# Patient Record
Sex: FEMALE | Race: WHITE | NOT HISPANIC OR LATINO | URBAN - METROPOLITAN AREA
[De-identification: names, ages, dates, MRNs, and addresses within clinical notes are randomized per-mention and may not be internally consistent; named-entity substitution may affect disease eponyms.]

---

## 2017-12-29 ENCOUNTER — LAB REQUISITION (OUTPATIENT)
Dept: LAB | Facility: HOSPITAL | Age: 32
End: 2017-12-29
Payer: COMMERCIAL

## 2017-12-29 ENCOUNTER — GENERIC CONVERSION - ENCOUNTER (OUTPATIENT)
Dept: OTHER | Facility: OTHER | Age: 32
End: 2017-12-29

## 2017-12-29 DIAGNOSIS — Z11.3 ENCOUNTER FOR SCREENING FOR INFECTIONS WITH PREDOMINANTLY SEXUAL MODE OF TRANSMISSION: ICD-10-CM

## 2017-12-29 DIAGNOSIS — Z01.419 ENCOUNTER FOR GYNECOLOGICAL EXAMINATION WITHOUT ABNORMAL FINDING: ICD-10-CM

## 2017-12-29 PROCEDURE — 87624 HPV HI-RISK TYP POOLED RSLT: CPT | Performed by: PHYSICIAN ASSISTANT

## 2017-12-29 PROCEDURE — G0145 SCR C/V CYTO,THINLAYER,RESCR: HCPCS | Performed by: PHYSICIAN ASSISTANT

## 2018-01-05 LAB
HPV RRNA GENITAL QL NAA+PROBE: NORMAL
LAB AP GYN PRIMARY INTERPRETATION: NORMAL
Lab: NORMAL

## 2018-01-14 NOTE — MISCELLANEOUS
Message   Recorded as Task   Date: 11/21/2016 01:20 PM, Created By: Natacha Lamar   Task Name: Call Back   Assigned To: Bobbi Woodruff   Regarding Patient: Tim Bergman, Status: Active   Comment:    Lisha Moya - 21 Nov 2016 1:20 PM     TASK CREATED  PT CALLED FOR A REFILL ON HER BCP TO GO TO [facility] [address] Silvia Raid - 21 Nov 2016 1:22 PM     TASK EDITED  sent to pharm,josy w/ wl in dec        Active Problems    1  Birth Control Method - Oral Contraceptives   2  Encounter for gynecological examination without abnormal finding (V72 31) (Z01 419)   3  Encounter for routine gynecological examination (V72 31) (Z01 419)    Current Meds   1  Microgestin FE 1 5/30 1 5-30 MG-MCG Oral Tablet; TAKE 1 TABLET DAILY; Therapy: [id number] to (Klaus Najera)  Requested for: [id number]; Last   Rx:[id number] Ordered    Allergies    1   No Known Drug Allergies    Plan  SocHx: Birth Control Method - Oral Contraceptives    · Microgestin FE 1 5/30 1 5-30 MG-MCG Oral Tablet; TAKE 1 TABLET DAILY    Signatures   Electronically signed by : Ronny Marinelli, ; Nov 21 2016  1:22PM EST                       (Author)

## 2018-01-24 VITALS
HEIGHT: 59 IN | WEIGHT: 145 LBS | BODY MASS INDEX: 29.23 KG/M2 | SYSTOLIC BLOOD PRESSURE: 122 MMHG | DIASTOLIC BLOOD PRESSURE: 78 MMHG

## 2018-12-28 DIAGNOSIS — IMO0001 BIRTH CONTROL: Primary | ICD-10-CM

## 2018-12-28 RX ORDER — NORETHINDRONE ACETATE AND ETHINYL ESTRADIOL 1.5-30(21)
KIT ORAL
Qty: 28 TABLET | Refills: 0 | Status: SHIPPED | OUTPATIENT
Start: 2018-12-28 | End: 2019-01-20 | Stop reason: SDUPTHER

## 2019-01-20 DIAGNOSIS — IMO0001 BIRTH CONTROL: ICD-10-CM

## 2019-01-21 RX ORDER — NORETHINDRONE ACETATE AND ETHINYL ESTRADIOL AND FERROUS FUMARATE 1.5-30(21)
KIT ORAL
Qty: 28 TABLET | Refills: 0 | Status: SHIPPED | OUTPATIENT
Start: 2019-01-21 | End: 2019-01-24 | Stop reason: SDUPTHER

## 2019-01-24 ENCOUNTER — ANNUAL EXAM (OUTPATIENT)
Dept: OBGYN CLINIC | Facility: CLINIC | Age: 34
End: 2019-01-24
Payer: COMMERCIAL

## 2019-01-24 VITALS
HEIGHT: 57 IN | SYSTOLIC BLOOD PRESSURE: 120 MMHG | BODY MASS INDEX: 39.05 KG/M2 | DIASTOLIC BLOOD PRESSURE: 78 MMHG | WEIGHT: 181 LBS

## 2019-01-24 DIAGNOSIS — Z01.419 ENCOUNTER FOR GYNECOLOGICAL EXAMINATION (GENERAL) (ROUTINE) WITHOUT ABNORMAL FINDINGS: Primary | ICD-10-CM

## 2019-01-24 DIAGNOSIS — Z30.41 ENCOUNTER FOR SURVEILLANCE OF CONTRACEPTIVE PILLS: ICD-10-CM

## 2019-01-24 PROCEDURE — 99395 PREV VISIT EST AGE 18-39: CPT | Performed by: NURSE PRACTITIONER

## 2019-01-24 RX ORDER — NORETHINDRONE ACETATE AND ETHINYL ESTRADIOL 1.5-30(21)
1 KIT ORAL DAILY
Qty: 90 TABLET | Refills: 3 | Status: SHIPPED | OUTPATIENT
Start: 2019-01-24 | End: 2020-01-23 | Stop reason: SDUPTHER

## 2019-01-24 RX ORDER — NORETHINDRONE ACETATE AND ETHINYL ESTRADIOL 1.5-30(21)
1 KIT ORAL DAILY
COMMUNITY
Start: 2012-08-03 | End: 2019-01-24 | Stop reason: SDUPTHER

## 2019-01-24 RX ORDER — PREDNISONE 20 MG/1
TABLET ORAL
Refills: 0 | COMMUNITY
Start: 2018-11-09 | End: 2020-02-18 | Stop reason: ALTCHOICE

## 2019-01-24 RX ORDER — FEXOFENADINE HYDROCHLORIDE 60 MG/1
60 TABLET, FILM COATED ORAL DAILY
COMMUNITY

## 2019-01-24 RX ORDER — DILTIAZEM HYDROCHLORIDE 60 MG/1
2 TABLET, FILM COATED ORAL 2 TIMES DAILY
Refills: 5 | COMMUNITY
Start: 2019-01-10 | End: 2020-03-16 | Stop reason: SDUPTHER

## 2019-01-24 RX ORDER — ALBUTEROL SULFATE 1.25 MG/3ML
SOLUTION RESPIRATORY (INHALATION)
COMMUNITY

## 2019-01-24 NOTE — ASSESSMENT & PLAN NOTE
Normal findings on routine annual gyn exam  Recommended monthly SBE, annual CBE  Reviewed ASCCP guidelines and pap noted to be up to date  STI testing was offered and declined at this time; the patient is aware that condoms are recommended for all sexual contact for prevention of STI  The patient likes current contraceptive measure-OCP Jaylon Galeano and desires to continue  Reviewed diet/activity recommendations and reasons to call  F/u in one year for routine annual gyn exam or sooner PRN

## 2019-01-24 NOTE — PROGRESS NOTES
Assessment/Plan:    Encounter for gynecological examination (general) (routine) without abnormal findings  Normal findings on routine annual gyn exam  Recommended monthly SBE, annual CBE  Reviewed ASCCP guidelines and pap noted to be up to date  STI testing was offered and declined at this time; the patient is aware that condoms are recommended for all sexual contact for prevention of STI  The patient likes current contraceptive measure-OCP Trev Dines and desires to continue  Reviewed diet/activity recommendations and reasons to call  F/u in one year for routine annual gyn exam or sooner PRN  Encounter for surveillance of contraceptive pills  The patient likes current OCP-Junel  She denies ACHES and desires to continue  She is aware condoms are advised with all sexual contact for prevention of STI  Refill provided  Reviewed reasons to call  Diagnoses and all orders for this visit:    Encounter for gynecological examination (general) (routine) without abnormal findings    Encounter for surveillance of contraceptive pills  -     norethindrone-ethinyl estradiol-iron (Pama Ceron FE 1 5/30) 1 5-30 MG-MCG tablet; Take 1 tablet by mouth daily    Other orders  -     SYMBICORT 80-4 5 MCG/ACT inhaler; Inhale 2 puffs 2 (two) times a day  -     albuterol (ACCUNEB) 1 25 MG/3ML nebulizer solution; Inhale  -     PROAIR  (90 Base) MCG/ACT inhaler; USE 2 PUFFS EVERY 4 HOURS AS NEEDED FOR WHEEZING  -     predniSONE 20 mg tablet; TAKE 3 TABLETS DAILY FOR 4 DAYS  -     Discontinue: norethindrone-ethinyl estradiol-iron (MICROGESTIN FE 1 5/30) 1 5-30 MG-MCG tablet; Take 1 tablet by mouth daily  -     fexofenadine (ALLEGRA) 60 MG tablet; Take 60 mg by mouth daily          Subjective:      Patient ID: Mathieu Mayers is a 29 y o  female  Jule Koyanagi presents for routine annual gyn exam    She denies acute gyn complaints  Likes current OCP and desires to continue; denies ACHES   She denies pelvic pain, abn discharge, breast concerns, bowel/bladder dysfunction, depression/anx  PMHx noncontributory  Gyn hx is benign - she denies prior h/o abn pap or STI  Last pap with HPV 12/29/17 was negative  FH noncontributory - she denies prior h/o breast, ovarian or colon cancer  Patient in long term monogamous relationship  Denies STI concerns  Patient started a new teaching job at [employer]  The following portions of the patient's history were reviewed and updated as appropriate: allergies, current medications, past family history, past medical history, past social history, past surgical history and problem list     Review of Systems   Constitutional: Negative  Denies breast concerns  HENT: Negative  Eyes: Negative  Respiratory: Negative  Cardiovascular: Negative  Gastrointestinal: Negative  Endocrine: Negative  Genitourinary: Negative  Musculoskeletal: Negative  Skin: Negative  Allergic/Immunologic: Negative  Neurological: Negative  Hematological: Negative  Psychiatric/Behavioral: Negative  Objective:      /78 (BP Location: Right arm, Patient Position: Sitting, Cuff Size: Large)   Ht 4' 8 5" (1 435 m)   Wt 82 1 kg (181 lb)   LMP 01/15/2019 (Approximate)   BMI 39 86 kg/m²          Physical Exam   Constitutional: She is oriented to person, place, and time  She appears well-developed and well-nourished  HENT:   Head: Normocephalic and atraumatic  Eyes: Pupils are equal, round, and reactive to light  Conjunctivae and EOM are normal    Neck: Normal range of motion  Neck supple  No thyromegaly present  Cardiovascular: Normal rate, regular rhythm and normal heart sounds  Pulmonary/Chest: Effort normal and breath sounds normal  No respiratory distress  She has no wheezes  She has no rhonchi  She has no rales  She exhibits no mass, no tenderness and no deformity   Right breast exhibits no inverted nipple, no mass, no nipple discharge, no skin change and no tenderness  Left breast exhibits no inverted nipple, no mass, no nipple discharge, no skin change and no tenderness  Breasts are symmetrical    Abdominal: Soft  There is no splenomegaly or hepatomegaly  There is no tenderness  There is no rebound and no guarding  Genitourinary: Rectum normal, vagina normal and uterus normal  No breast swelling, tenderness, discharge or bleeding  No labial fusion  There is no rash, tenderness, lesion or injury on the right labia  There is no rash, tenderness, lesion or injury on the left labia  Cervix exhibits no motion tenderness, no discharge and no friability  Right adnexum displays no mass, no tenderness and no fullness  Left adnexum displays no mass, no tenderness and no fullness  No erythema, tenderness or bleeding in the vagina  No foreign body in the vagina  No signs of injury around the vagina  No vaginal discharge found  Musculoskeletal: Normal range of motion  Lymphadenopathy:     She has no cervical adenopathy  She has no axillary adenopathy  Neurological: She is alert and oriented to person, place, and time  No cranial nerve deficit  Skin: Skin is warm, dry and intact  No rash noted  No cyanosis  Nails show no clubbing  Psychiatric: She has a normal mood and affect  Her speech is normal and behavior is normal  Judgment and thought content normal  Cognition and memory are normal    Vitals reviewed

## 2019-01-24 NOTE — ASSESSMENT & PLAN NOTE
The patient likes current OCP-Junel  She denies ACHES and desires to continue  She is aware condoms are advised with all sexual contact for prevention of STI  Refill provided  Reviewed reasons to call

## 2020-01-23 DIAGNOSIS — Z30.41 ENCOUNTER FOR SURVEILLANCE OF CONTRACEPTIVE PILLS: ICD-10-CM

## 2020-01-23 RX ORDER — NORETHINDRONE ACETATE AND ETHINYL ESTRADIOL 1.5-30(21)
1 KIT ORAL DAILY
Qty: 90 TABLET | Refills: 0 | Status: SHIPPED | OUTPATIENT
Start: 2020-01-23 | End: 2020-02-18 | Stop reason: SDUPTHER

## 2020-02-18 ENCOUNTER — ANNUAL EXAM (OUTPATIENT)
Dept: OBGYN CLINIC | Facility: CLINIC | Age: 35
End: 2020-02-18
Payer: COMMERCIAL

## 2020-02-18 VITALS
BODY MASS INDEX: 42.07 KG/M2 | DIASTOLIC BLOOD PRESSURE: 64 MMHG | HEIGHT: 56 IN | WEIGHT: 187 LBS | SYSTOLIC BLOOD PRESSURE: 128 MMHG

## 2020-02-18 DIAGNOSIS — N94.6 DYSMENORRHEA: ICD-10-CM

## 2020-02-18 DIAGNOSIS — Z30.41 ENCOUNTER FOR SURVEILLANCE OF CONTRACEPTIVE PILLS: ICD-10-CM

## 2020-02-18 DIAGNOSIS — Z01.419 ENCNTR FOR GYN EXAM (GENERAL) (ROUTINE) W/O ABN FINDINGS: Primary | ICD-10-CM

## 2020-02-18 PROBLEM — J45.909 ASTHMA: Status: ACTIVE | Noted: 2020-02-18

## 2020-02-18 PROCEDURE — S0612 ANNUAL GYNECOLOGICAL EXAMINA: HCPCS | Performed by: PHYSICIAN ASSISTANT

## 2020-02-18 RX ORDER — NORETHINDRONE ACETATE AND ETHINYL ESTRADIOL 1.5-30(21)
1 KIT ORAL DAILY
Qty: 90 TABLET | Refills: 3 | Status: SHIPPED | OUTPATIENT
Start: 2020-02-18 | End: 2020-06-01 | Stop reason: SDUPTHER

## 2020-02-18 NOTE — PROGRESS NOTES
Marleny Reason  1985      CC:  Yearly exam    S:  36 y o  female here for yearly exam  Her cycles are regular, not heavy or crampy  Sexual activity: She is sexually active without pain, bleeding or dryness  Contraception: She uses Junel Fe 1 5/30 and condoms for contraception  She is engaged to be  and they just bought a house together  They do not want future pregnancy  She teaches math at [employer]  Last Pap 12/29/17 neg/neg  Last Mammo never    We reviewed [facility] guidelines for Pap testing today  Family hx of breast cancer: no  Family hx of ovarian cancer: no  Family hx of colon cancer: no      Current Outpatient Medications:     albuterol (ACCUNEB) 1 25 MG/3ML nebulizer solution, Inhale, Disp: , Rfl:     fexofenadine (ALLEGRA) 60 MG tablet, Take 60 mg by mouth daily, Disp: , Rfl:     norethindrone-ethinyl estradiol-iron (JUNEL FE 1 5/30) 1 5-30 MG-MCG tablet, Take 1 tablet by mouth daily, Disp: 90 tablet, Rfl: 0    PROAIR  (90 Base) MCG/ACT inhaler, USE 2 PUFFS EVERY 4 HOURS AS NEEDED FOR WHEEZING, Disp: , Rfl: 5    SYMBICORT 80-4 5 MCG/ACT inhaler, Inhale 2 puffs 2 (two) times a day, Disp: , Rfl: 5  Social History     Socioeconomic History    Marital status: Single     Spouse name: Not on file    Number of children: Not on file    Years of education: Not on file    Highest education level: Not on file   Occupational History    Not on file   Social Needs    Financial resource strain: Not on file    Food insecurity:     Worry: Not on file     Inability: Not on file    Transportation needs:     Medical: Not on file     Non-medical: Not on file   Tobacco Use    Smoking status: Never Smoker    Smokeless tobacco: Never Used   Substance and Sexual Activity    Alcohol use:  Yes     Alcohol/week: 3 0 - 5 0 standard drinks     Types: 3 - 5 Cans of beer per week     Frequency: 2-4 times a month     Drinks per session: 1 or 2     Binge frequency: Never    Drug use: No    Sexual activity: Yes     Partners: Male     Birth control/protection: OCP   Lifestyle    Physical activity:     Days per week: Not on file     Minutes per session: Not on file    Stress: Not on file   Relationships    Social connections:     Talks on phone: Not on file     Gets together: Not on file     Attends Protestant service: Not on file     Active member of club or organization: Not on file     Attends meetings of clubs or organizations: Not on file     Relationship status: Not on file    Intimate partner violence:     Fear of current or ex partner: Not on file     Emotionally abused: Not on file     Physically abused: Not on file     Forced sexual activity: Not on file   Other Topics Concern    Not on file   Social History Narrative    Not on file     Family History   Problem Relation Age of Onset    No Known Problems Mother     Hyperlipidemia Father     Hypertension Father     No Known Problems Sister     No Known Problems Sister     No Known Problems Sister       Past Medical History:   Diagnosis Date    Asthma     Urinary tract infection         Review of Systems   Respiratory: Negative  Cardiovascular: Negative  Gastrointestinal: Negative for constipation and diarrhea  Genitourinary: Negative for difficulty urinating, pelvic pain, vaginal bleeding, vaginal discharge, itching or odor  O:  Blood pressure 128/64, height 4' 8 3" (1 43 m), weight 84 8 kg (187 lb), last menstrual period 02/11/2020  Patient appears well and is not in distress  Neck is supple without masses  Breasts are symmetrical without mass, tenderness, nipple discharge, skin changes or adenopathy  Abdomen is soft and nontender without masses  External genitals are normal without lesions or rashes  Urethral meatus and urethra are normal  Bladder is normal to palpation  Vagina is normal without discharge or bleeding  Cervix is normal without discharge or lesion     Uterus is normal, mobile, nontender without palpable mass  Adnexa are normal, nontender, without palpable mass  A:  Yearly exam      P:   Pap 2022   Junel Fe 1 5/30 sent to pharmacy     RTO one year for yearly exam or sooner as needed

## 2020-03-16 ENCOUNTER — AMB VIDEO VISIT (OUTPATIENT)
Dept: URGENT CARE | Age: 35
End: 2020-03-16

## 2020-03-16 DIAGNOSIS — J45.901 MILD ASTHMA WITH ACUTE EXACERBATION, UNSPECIFIED WHETHER PERSISTENT: Primary | ICD-10-CM

## 2020-03-16 RX ORDER — ALBUTEROL SULFATE 90 UG/1
2 AEROSOL, METERED RESPIRATORY (INHALATION) EVERY 6 HOURS PRN
Qty: 18 G | Refills: 0 | Status: SHIPPED | OUTPATIENT
Start: 2020-03-16 | End: 2020-03-16

## 2020-03-16 RX ORDER — METHYLPREDNISOLONE 4 MG/1
TABLET ORAL
Qty: 21 TABLET | Refills: 0 | Status: SHIPPED | OUTPATIENT
Start: 2020-03-16 | End: 2020-03-16

## 2020-03-16 RX ORDER — DILTIAZEM HYDROCHLORIDE 60 MG/1
2 TABLET, FILM COATED ORAL 2 TIMES DAILY
Qty: 1 INHALER | Refills: 5 | Status: SHIPPED | OUTPATIENT
Start: 2020-03-16 | End: 2020-03-16

## 2020-03-16 RX ORDER — ALBUTEROL SULFATE 90 UG/1
2 AEROSOL, METERED RESPIRATORY (INHALATION) EVERY 6 HOURS PRN
Qty: 18 G | Refills: 0 | Status: SHIPPED | OUTPATIENT
Start: 2020-03-16

## 2020-03-16 RX ORDER — DILTIAZEM HYDROCHLORIDE 60 MG/1
2 TABLET, FILM COATED ORAL 2 TIMES DAILY
Qty: 1 INHALER | Refills: 5 | Status: SHIPPED | OUTPATIENT
Start: 2020-03-16

## 2020-03-16 RX ORDER — METHYLPREDNISOLONE 4 MG/1
TABLET ORAL
Qty: 21 TABLET | Refills: 0 | Status: SHIPPED | OUTPATIENT
Start: 2020-03-16 | End: 2021-05-25 | Stop reason: ALTCHOICE

## 2020-03-16 NOTE — PROGRESS NOTES
Video Visit - Barbara Mccartney 36 y o  female MRN: [id number]    REQUIRED DOCUMENTATION:       1  [facility] was [facility]  2  Provider located at [place] [phone]   3  [facility] provider: Bismark Coe PA-C   4  Identify all parties in room with patient during [facility] visit:  Self  5  After connecting through [employer], patient was identified by name and date of birth  Patient was then informed that this was a Telemedicine visit and that the exam was being conducted confidentially over secure lines  My office door was closed  No one else was in the room  Patient acknowledged consent and understanding of privacy and security of the Telemedicine visit  I informed the patient that I have reviewed their record in Epic and presented the opportunity for them to ask any questions regarding the visit today  The patient agreed to participate  Asthma   She complains of chest tightness, frequent throat clearing and wheezing  There is no difficulty breathing, hoarse voice or shortness of breath  Chronicity: Pt has had asthma her whole life  Usually worse with change of seasons  Pt recently moved into new house that usd to have dogs and now things are worse  Episode onset: Over the past week  The problem occurs constantly  The problem has been waxing and waning  Associated symptoms include nasal congestion and postnasal drip  Pertinent negatives include no chest pain, ear congestion, fever, myalgias, sneezing, sore throat or trouble swallowing  Exacerbated by: Worse with pollens and pet dander  Her past medical history is significant for asthma  Pt taking allegra, benadryl, symbicort, and occasioanlly albuterol  Mild-moderate relief  Pt has significnat PMH recurrent episodes of asthma related to allergies  This is identical to prior episodes  Not worst episode of life    Denies any recent travel or sick contacts  Pt currently working from home  Pt does not have PCP and would like PCP referral   Patient states currently her symptoms are mild  LMP last week  Physical Exam   Constitutional: She appears well-developed and well-nourished  No distress  HENT:   Head: Normocephalic and atraumatic  Mouth/Throat: Oropharynx is clear and moist    Eyes: Conjunctivae are normal    Cardiovascular:   Unable to assess in this setting  Pulmonary/Chest: Effort normal  No respiratory distress  She has no wheezes (No audible wheezing heard, however, unable to perform full exam in this setting )  Patient speaking full sentences  No acute distress  Skin: Capillary refill takes less than 2 seconds  No rash noted  She is not diaphoretic  No pallor  Diagnoses and all orders for this visit:    Mild asthma with acute exacerbation, unspecified whether persistent  -     Discontinue: methylPREDNISolone 4 MG tablet therapy pack; Use as directed on package  -     Discontinue: albuterol (Ventolin HFA) 90 mcg/act inhaler; Inhale 2 puffs every 6 (six) hours as needed for wheezing or shortness of breath  -     Discontinue: SYMBICORT 80-4 5 MCG/ACT inhaler; Inhale 2 puffs 2 (two) times a day  -     SYMBICORT 80-4 5 MCG/ACT inhaler; Inhale 2 puffs 2 (two) times a day  -     albuterol (Ventolin HFA) 90 mcg/act inhaler; Inhale 2 puffs every 6 (six) hours as needed for wheezing or shortness of breath  -     methylPREDNISolone 4 MG tablet therapy pack; Use as directed on package  -     Ambulatory referral to [facility]; Future      Patient Instructions     Continue to monitor symptoms  If new or worsening symptoms develop, go immediately to Er  Drink plenty of fluids  Follow up with Family Doctor this week  Asthma   WHAT YOU NEED TO KNOW:   Asthma is a lung disease that makes breathing difficult  Chronic inflammation and reactions to triggers narrow the airways in the lungs   Asthma can become life-threatening if it is not managed  DISCHARGE INSTRUCTIONS:   Return to the emergency department if:   · You have severe shortness of breath  · Your lips or nails turn blue or gray  · The skin around your neck and ribs pulls in with each breath  · You have shortness of breath, even after you take your short-term medicine as directed  · Your peak flow numbers are in the red zone of your AAP  Contact your healthcare provider if:   · You run out of medicine before your next refill is due  · Your symptoms get worse  · You need to take more medicine than usual to control your symptoms  · You have questions or concerns about your condition or care  Medicines:   · Medicines  decrease inflammation, open airways, and make it easier to breathe  Medicines may be inhaled, taken as a pill, or injected  Short-term medicines relieve your symptoms quickly  Long-term medicines are used to prevent future attacks  You may also need medicine to help control your allergies  Ask your healthcare provider for more information about the medicine you are given and how to take it safely  · Take your medicine as directed  Contact your healthcare provider if you think your medicine is not helping or if you have side effects  Tell him of her if you are allergic to any medicine  Keep a list of the medicines, vitamins, and herbs you take  Include the amounts, and when and why you take them  Bring the list or the pill bottles to follow-up visits  Carry your medicine list with you in case of an emergency  Follow up with your healthcare provider as directed: You will need to return to make sure your medicine is working and your symptoms are controlled  You may be referred to an asthma specialist  Deepa Loomis may be asked to keep a record of your peak flow values and bring it with you to your appointments  Write down your questions so you remember to ask them during your visits    Manage your symptoms and prevent future attacks:   · Follow your Asthma Action Plan (AAP)  This is a written plan that you and your healthcare provider create  It explains which medicine you need and when to change doses if necessary  It also explains how you can monitor symptoms and use a peak flow meter  The meter measures how well your lungs are working  · Manage other health conditions , such as allergies, acid reflux, and sleep apnea  · Identify and avoid triggers  These may include pets, dust mites, mold, and cockroaches  · Do not smoke or be around others who smoke  Nicotine and other chemicals in cigarettes and cigars can cause lung damage  Ask your healthcare provider for information if you currently smoke and need help to quit  E-cigarettes or smokeless tobacco still contain nicotine  Talk to your healthcare provider before you use these products  · Ask about the flu vaccine  The flu can make your asthma worse  You may need a yearly flu shot  © 2017 [employer]  Information is for End User's use only and may not be sold, redistributed or otherwise used for commercial purposes  All illustrations and images included in CareNotes® are the copyrighted property of A D A M , Inc  or Michael Alejandro  The above information is an  only  It is not intended as medical advice for individual conditions or treatments  Talk to your doctor, nurse or pharmacist before following any medical regimen to see if it is safe and effective for you  Follow up with PCP if not improved, if symptoms are worse, go to the ER

## 2020-03-16 NOTE — PATIENT INSTRUCTIONS
Continue to monitor symptoms  If new or worsening symptoms develop, go immediately to Er  Drink plenty of fluids  Follow up with Family Doctor this week  Asthma   WHAT YOU NEED TO KNOW:   Asthma is a lung disease that makes breathing difficult  Chronic inflammation and reactions to triggers narrow the airways in the lungs  Asthma can become life-threatening if it is not managed  DISCHARGE INSTRUCTIONS:   Return to the emergency department if:   · You have severe shortness of breath  · Your lips or nails turn blue or gray  · The skin around your neck and ribs pulls in with each breath  · You have shortness of breath, even after you take your short-term medicine as directed  · Your peak flow numbers are in the red zone of your AAP  Contact your healthcare provider if:   · You run out of medicine before your next refill is due  · Your symptoms get worse  · You need to take more medicine than usual to control your symptoms  · You have questions or concerns about your condition or care  Medicines:   · Medicines  decrease inflammation, open airways, and make it easier to breathe  Medicines may be inhaled, taken as a pill, or injected  Short-term medicines relieve your symptoms quickly  Long-term medicines are used to prevent future attacks  You may also need medicine to help control your allergies  Ask your healthcare provider for more information about the medicine you are given and how to take it safely  · Take your medicine as directed  Contact your healthcare provider if you think your medicine is not helping or if you have side effects  Tell him of her if you are allergic to any medicine  Keep a list of the medicines, vitamins, and herbs you take  Include the amounts, and when and why you take them  Bring the list or the pill bottles to follow-up visits  Carry your medicine list with you in case of an emergency  Follow up with your healthcare provider as directed:   You will need to return to make sure your medicine is working and your symptoms are controlled  You may be referred to an asthma specialist  Rosie Hint may be asked to keep a record of your peak flow values and bring it with you to your appointments  Write down your questions so you remember to ask them during your visits  Manage your symptoms and prevent future attacks:   · Follow your Asthma Action Plan (AAP)  This is a written plan that you and your healthcare provider create  It explains which medicine you need and when to change doses if necessary  It also explains how you can monitor symptoms and use a peak flow meter  The meter measures how well your lungs are working  · Manage other health conditions , such as allergies, acid reflux, and sleep apnea  · Identify and avoid triggers  These may include pets, dust mites, mold, and cockroaches  · Do not smoke or be around others who smoke  Nicotine and other chemicals in cigarettes and cigars can cause lung damage  Ask your healthcare provider for information if you currently smoke and need help to quit  E-cigarettes or smokeless tobacco still contain nicotine  Talk to your healthcare provider before you use these products  · Ask about the flu vaccine  The flu can make your asthma worse  You may need a yearly flu shot  © 2017 [employer]  Information is for End User's use only and may not be sold, redistributed or otherwise used for commercial purposes  All illustrations and images included in CareNotes® are the copyrighted property of [employer]  or Michael Alejandro  The above information is an  only  It is not intended as medical advice for individual conditions or treatments  Talk to your doctor, nurse or pharmacist before following any medical regimen to see if it is safe and effective for you

## 2020-03-17 NOTE — CARE ANYWHERE EVISITS
Visit Summary for Mark Lara - Gender: Female - Date of Birth: [id number]  Date: [id number] - Duration: 9 minutes  Patient: Mark Lara  Provider: Juli Briones PA-C    Patient Contact Information  Address  [address] [place] [id number]  [phone]    Visit Topics  Asthma: other [Added By: Self - 2020-03-16]    Triage Questions   Have you had any international travel in the last 14 days? Answer [No]  Have you had any exposure to a known or expected Covid-19 patient in the last 14 days? Answer [No]  Do you have any immune system compromise or chronic lung disease? Answer [Asthma]  Do you have any vulnerable family members in the home (infant, pregnant, cancer, elderly)? Answer Pavel Mccrary with [employer]  [0A][0A] [Notification] You are connected with Juli Briones PA-C, [facility] [address]  Brandie Lara is located in [place]  [0A][Notification] Lori Lara has shared health history  Helen Guido  [0A]    Diagnosis  Unspecified asthma with (acute) exacerbation    Procedures  Value: [id number] Code: CPT-4 UNLISTED E&M SERVICE    Medications Prescribed    ProAir HFA      Frequency :           Symbicort      Frequency :           Allegra Allergy      Frequency :           Junel FE 1 5/30 (28)      Frequency :             Electronically signed by: Angel Young(NPI [id number])

## 2020-04-14 ENCOUNTER — TELEPHONE (OUTPATIENT)
Dept: URGENT CARE | Age: 35
End: 2020-04-14

## 2020-06-01 DIAGNOSIS — Z30.41 ENCOUNTER FOR SURVEILLANCE OF CONTRACEPTIVE PILLS: ICD-10-CM

## 2020-06-01 DIAGNOSIS — N94.6 DYSMENORRHEA: ICD-10-CM

## 2020-06-01 RX ORDER — NORETHINDRONE ACETATE AND ETHINYL ESTRADIOL 1.5-30(21)
1 KIT ORAL DAILY
Qty: 90 TABLET | Refills: 3 | Status: SHIPPED | OUTPATIENT
Start: 2020-06-01 | End: 2021-04-29 | Stop reason: SDUPTHER

## 2020-11-25 ENCOUNTER — TELEPHONE (OUTPATIENT)
Dept: FAMILY MEDICINE CLINIC | Facility: CLINIC | Age: 35
End: 2020-11-25

## 2021-03-10 DIAGNOSIS — Z23 ENCOUNTER FOR IMMUNIZATION: ICD-10-CM

## 2021-03-24 ENCOUNTER — IMMUNIZATIONS (OUTPATIENT)
Dept: FAMILY MEDICINE CLINIC | Facility: HOSPITAL | Age: 36
End: 2021-03-24

## 2021-03-24 DIAGNOSIS — Z23 ENCOUNTER FOR IMMUNIZATION: Primary | ICD-10-CM

## 2021-03-24 PROCEDURE — 0011A SARS-COV-2 / COVID-19 MRNA VACCINE (MODERNA) 100 MCG: CPT

## 2021-03-24 PROCEDURE — 91301 SARS-COV-2 / COVID-19 MRNA VACCINE (MODERNA) 100 MCG: CPT

## 2021-04-23 ENCOUNTER — IMMUNIZATIONS (OUTPATIENT)
Dept: FAMILY MEDICINE CLINIC | Facility: HOSPITAL | Age: 36
End: 2021-04-23

## 2021-04-23 DIAGNOSIS — Z23 ENCOUNTER FOR IMMUNIZATION: Primary | ICD-10-CM

## 2021-04-23 PROCEDURE — 91301 SARS-COV-2 / COVID-19 MRNA VACCINE (MODERNA) 100 MCG: CPT

## 2021-04-23 PROCEDURE — 0012A SARS-COV-2 / COVID-19 MRNA VACCINE (MODERNA) 100 MCG: CPT

## 2021-04-26 DIAGNOSIS — Z30.41 ENCOUNTER FOR SURVEILLANCE OF CONTRACEPTIVE PILLS: ICD-10-CM

## 2021-04-26 DIAGNOSIS — N94.6 DYSMENORRHEA: ICD-10-CM

## 2021-04-26 RX ORDER — NORETHINDRONE ACETATE AND ETHINYL ESTRADIOL 1.5-30(21)
1 KIT ORAL DAILY
Qty: 90 TABLET | Refills: 0 | Status: CANCELLED | OUTPATIENT
Start: 2021-04-26

## 2021-04-29 DIAGNOSIS — N94.6 DYSMENORRHEA: ICD-10-CM

## 2021-04-29 DIAGNOSIS — Z30.41 ENCOUNTER FOR SURVEILLANCE OF CONTRACEPTIVE PILLS: ICD-10-CM

## 2021-05-03 RX ORDER — NORETHINDRONE ACETATE AND ETHINYL ESTRADIOL 1.5-30(21)
1 KIT ORAL DAILY
Qty: 90 TABLET | Refills: 0 | Status: SHIPPED | OUTPATIENT
Start: 2021-05-03 | End: 2021-05-25 | Stop reason: SDUPTHER

## 2021-05-25 ENCOUNTER — ANNUAL EXAM (OUTPATIENT)
Dept: OBGYN CLINIC | Facility: CLINIC | Age: 36
End: 2021-05-25
Payer: COMMERCIAL

## 2021-05-25 VITALS
HEIGHT: 59 IN | SYSTOLIC BLOOD PRESSURE: 120 MMHG | BODY MASS INDEX: 33.79 KG/M2 | WEIGHT: 167.6 LBS | DIASTOLIC BLOOD PRESSURE: 78 MMHG

## 2021-05-25 DIAGNOSIS — Z01.419 ENCNTR FOR GYN EXAM (GENERAL) (ROUTINE) W/O ABN FINDINGS: Primary | ICD-10-CM

## 2021-05-25 DIAGNOSIS — Z30.41 ENCOUNTER FOR SURVEILLANCE OF CONTRACEPTIVE PILLS: ICD-10-CM

## 2021-05-25 DIAGNOSIS — N94.6 DYSMENORRHEA: ICD-10-CM

## 2021-05-25 PROBLEM — J45.30 MILD PERSISTENT ASTHMA: Status: ACTIVE | Noted: 2021-05-25

## 2021-05-25 PROBLEM — J45.909 ASTHMA: Status: RESOLVED | Noted: 2020-02-18 | Resolved: 2021-05-25

## 2021-05-25 PROCEDURE — S0612 ANNUAL GYNECOLOGICAL EXAMINA: HCPCS | Performed by: PHYSICIAN ASSISTANT

## 2021-05-25 RX ORDER — NORETHINDRONE ACETATE AND ETHINYL ESTRADIOL 1.5-30(21)
1 KIT ORAL DAILY
Qty: 90 TABLET | Refills: 3 | Status: SHIPPED | OUTPATIENT
Start: 2021-05-25 | End: 2022-06-24 | Stop reason: SDUPTHER

## 2021-05-25 NOTE — PROGRESS NOTES
John Begun  1985      CC:  Yearly exam    S:  30 y o  female here for yearly exam  Her cycles are regular, not heavy or crampy  Sexual activity: She is sexually active without pain, bleeding or dryness  Contraception: She uses Junel Fe 1 5/30 for contraception  Last Pap 12/29/17 neg/neg    We reviewed [facility] guidelines for Pap testing today  Family hx of breast cancer: no  Family hx of ovarian cancer: no  Family hx of colon cancer: no      Current Outpatient Medications:     albuterol (ACCUNEB) 1 25 MG/3ML nebulizer solution, Inhale, Disp: , Rfl:     albuterol (Ventolin HFA) 90 mcg/act inhaler, Inhale 2 puffs every 6 (six) hours as needed for wheezing or shortness of breath, Disp: 18 g, Rfl: 0    fexofenadine (ALLEGRA) 60 MG tablet, Take 60 mg by mouth daily, Disp: , Rfl:     norethindrone-ethinyl estradiol-iron (Junel FE 1 5/30) 1 5-30 MG-MCG tablet, Take 1 tablet by mouth daily, Disp: 90 tablet, Rfl: 0    PROAIR  (90 Base) MCG/ACT inhaler, USE 2 PUFFS EVERY 4 HOURS AS NEEDED FOR WHEEZING, Disp: , Rfl: 5    SYMBICORT 80-4 5 MCG/ACT inhaler, Inhale 2 puffs 2 (two) times a day, Disp: 1 Inhaler, Rfl: 5  Social History     Socioeconomic History    Marital status: Single     Spouse name: Not on file    Number of children: Not on file    Years of education: Not on file    Highest education level: Not on file   Occupational History    Not on file   Social Needs    Financial resource strain: Not on file    Food insecurity     Worry: Not on file     Inability: Not on file    Transportation needs     Medical: Not on file     Non-medical: Not on file   Tobacco Use    Smoking status: Never Smoker    Smokeless tobacco: Never Used   Substance and Sexual Activity    Alcohol use:  Yes     Alcohol/week: 3 0 - 5 0 standard drinks     Types: 3 - 5 Cans of beer per week     Frequency: 2-4 times a month     Drinks per session: 1 or 2     Binge frequency: Never    Drug use: No    Sexual activity: Yes     Partners: Male     Birth control/protection: OCP   Lifestyle    Physical activity     Days per week: Not on file     Minutes per session: Not on file    Stress: Not on file   Relationships    Social connections     Talks on phone: Not on file     Gets together: Not on file     Attends Sikh service: Not on file     Active member of club or organization: Not on file     Attends meetings of clubs or organizations: Not on file     Relationship status: Not on file    Intimate partner violence     Fear of current or ex partner: Not on file     Emotionally abused: Not on file     Physically abused: Not on file     Forced sexual activity: Not on file   Other Topics Concern    Not on file   Social History Narrative    Not on file     Family History   Problem Relation Age of Onset    No Known Problems Mother     Hyperlipidemia Father     Hypertension Father     Diabetes Father     No Known Problems Sister     No Known Problems Sister     No Known Problems Sister       Past Medical History:   Diagnosis Date    Asthma     Urinary tract infection         Review of Systems   Respiratory: Negative  Cardiovascular: Negative  Gastrointestinal: Negative for constipation and diarrhea  Genitourinary: Negative for difficulty urinating, pelvic pain, vaginal bleeding, vaginal discharge, itching or odor  O:  Blood pressure 120/78, height 4' 10 66" (1 49 m), weight 76 kg (167 lb 9 6 oz), last menstrual period 05/04/2021  Patient appears well and is not in distress  Neck is supple without masses  Breasts are symmetrical without mass, tenderness, nipple discharge, skin changes or adenopathy  Abdomen is soft and nontender without masses  External genitals are normal without lesions or rashes  Urethral meatus and urethra are normal  Bladder is normal to palpation  Vagina is normal without discharge or bleeding  Cervix is normal without discharge or lesion     Uterus is normal, mobile, nontender without palpable mass  Adnexa are normal, nontender, without palpable mass  A:  Yearly exam      P:   Pap 2022    Junel Fe 1 5/30 sent to mail order     RTO one year for yearly exam or sooner as needed

## 2021-07-02 ENCOUNTER — OFFICE VISIT (OUTPATIENT)
Dept: FAMILY MEDICINE CLINIC | Facility: CLINIC | Age: 36
End: 2021-07-02
Payer: COMMERCIAL

## 2021-07-02 VITALS
WEIGHT: 166 LBS | BODY MASS INDEX: 33.47 KG/M2 | DIASTOLIC BLOOD PRESSURE: 80 MMHG | TEMPERATURE: 98.6 F | HEART RATE: 90 BPM | SYSTOLIC BLOOD PRESSURE: 118 MMHG | HEIGHT: 59 IN | OXYGEN SATURATION: 98 %

## 2021-07-02 DIAGNOSIS — J45.31 MILD PERSISTENT ASTHMA WITH ACUTE EXACERBATION: ICD-10-CM

## 2021-07-02 DIAGNOSIS — L98.9 SKIN LESION OF FOOT: Primary | ICD-10-CM

## 2021-07-02 DIAGNOSIS — Z11.59 NEED FOR HEPATITIS C SCREENING TEST: ICD-10-CM

## 2021-07-02 DIAGNOSIS — Z23 ENCOUNTER FOR IMMUNIZATION: ICD-10-CM

## 2021-07-02 DIAGNOSIS — Z13.6 SCREENING FOR CARDIOVASCULAR CONDITION: ICD-10-CM

## 2021-07-02 DIAGNOSIS — Z11.4 SCREENING FOR HIV (HUMAN IMMUNODEFICIENCY VIRUS): ICD-10-CM

## 2021-07-02 DIAGNOSIS — J30.81 ALLERGIC TO PETS: ICD-10-CM

## 2021-07-02 PROCEDURE — 3008F BODY MASS INDEX DOCD: CPT | Performed by: FAMILY MEDICINE

## 2021-07-02 PROCEDURE — 99203 OFFICE O/P NEW LOW 30 MIN: CPT | Performed by: FAMILY MEDICINE

## 2021-07-02 PROCEDURE — 90715 TDAP VACCINE 7 YRS/> IM: CPT | Performed by: FAMILY MEDICINE

## 2021-07-02 PROCEDURE — 90471 IMMUNIZATION ADMIN: CPT | Performed by: FAMILY MEDICINE

## 2021-07-02 PROCEDURE — 1036F TOBACCO NON-USER: CPT | Performed by: FAMILY MEDICINE

## 2021-07-02 NOTE — ASSESSMENT & PLAN NOTE
C/w current tx; referred to allergy as she has severe symptoms with seasonal allergies and pet dander

## 2021-07-02 NOTE — ASSESSMENT & PLAN NOTE
Referred to podiatry; will trial OTC medication; suspect more callous component as appears less like wart today on exam

## 2021-07-02 NOTE — PROGRESS NOTES
Assessment/Plan:     1  Skin lesion of foot  Assessment & Plan:  Referred to podiatry; will trial OTC medication; suspect more callous component as appears less like wart today on exam    Orders:  -     Ambulatory referral to Podiatry; Future    2  Mild persistent asthma with acute exacerbation  Assessment & Plan:  C/w current tx; referred to allergy as she has severe symptoms with seasonal allergies and pet dander    Orders:  -     Ambulatory referral to Allergy; Future    3  Allergic to pets  Assessment & Plan:  Takes OTC allergy medication; referred to allergy per pt request    Orders:  -     Ambulatory referral to Allergy; Future    4  Screening for cardiovascular condition  -     Comprehensive metabolic panel; Future  -     Lipid panel; Future    5  Screening for HIV (human immunodeficiency virus)  -     HIV 1/2 Antigen/Antibody (4th Generation) w Reflex [facility]; Future    6  Need for hepatitis C screening test  -     Hepatitis C antibody; Future    7  Encounter for immunization  -     TDAP VACCINE GREATER THAN OR EQUAL TO 6YO IM        Subjective:      Patient ID: Jada Casarez is a 30 y o  female  Patient states started after a lot of walking  Feels like something under the skin  Not superficial like wart  She doesn't notice it with shoes on but walking barefoot or after taking shoes off it hurts  Started about 2 months ago  Asthma: Patient states she gets symbicort daily  She has bad allergies to pet dander  About once a year she requires an oral steroid  She states pets are a trigger or a cold  Mostly her triggers are allergy season  She is not using her albuterol often, usually about once a week         The following portions of the patient's history were reviewed and updated as appropriate: allergies, current medications, past family history, past medical history, past social history, past surgical history, and problem list     Review of Systems   Constitutional: Negative for chills, fatigue and fever    Respiratory: Negative for cough, shortness of breath and wheezing  Cardiovascular: Negative for chest pain and palpitations  Musculoskeletal: Negative for gait problem  Skin:        As per HPI         Objective:      /80 (BP Location: Right arm, Patient Position: Sitting)   Pulse 90   Temp 98 6 °F (37 °C) (Tympanic)   Ht 4' 11" (1 499 m)   Wt 75 3 kg (166 lb)   LMP 06/29/2021 (Exact Date)   SpO2 98%   BMI 33 53 kg/m²          Physical Exam  Vitals reviewed  Constitutional:       General: She is not in acute distress  Appearance: Normal appearance  She is not ill-appearing, toxic-appearing or diaphoretic  HENT:      Head: Normocephalic and atraumatic  Eyes:      General: No scleral icterus  Right eye: No discharge  Left eye: No discharge  Conjunctiva/sclera: Conjunctivae normal    Cardiovascular:      Rate and Rhythm: Normal rate and regular rhythm  Pulses: Normal pulses  Heart sounds: Normal heart sounds  No murmur heard  No gallop  Pulmonary:      Effort: Pulmonary effort is normal  No respiratory distress  Breath sounds: Normal breath sounds  No stridor  No wheezing, rhonchi or rales  Musculoskeletal:      Right lower leg: No edema  Left lower leg: No edema  Skin:         Neurological:      General: No focal deficit present  Mental Status: She is alert and oriented to person, place, and time  Psychiatric:         Mood and Affect: Mood normal          Behavior: Behavior normal          Thought Content:  Thought content normal          Judgment: Judgment normal

## 2021-07-02 NOTE — PROGRESS NOTES
Patient states started after a lot of walking  Feels like something under the skin  Not superficial like wart  She doesn't notice it with shoes on but walking barefoot or after taking shoes off it hurts  Started about 2 months ago  Asthma: Patient states she gets symbicort daily  She has bad allergies to pet dander  About once a year she requires an oral steroid  She states pets are a trigger or a cold  Mostly her triggers are allergy season  She is not using her albuterol often, usually about once a week

## 2021-08-09 ENCOUNTER — OFFICE VISIT (OUTPATIENT)
Dept: PODIATRY | Facility: CLINIC | Age: 36
End: 2021-08-09
Payer: COMMERCIAL

## 2021-08-09 VITALS
WEIGHT: 165 LBS | DIASTOLIC BLOOD PRESSURE: 83 MMHG | HEART RATE: 87 BPM | BODY MASS INDEX: 33.26 KG/M2 | HEIGHT: 59 IN | SYSTOLIC BLOOD PRESSURE: 127 MMHG

## 2021-08-09 DIAGNOSIS — L85.2 KERATODERMA PUNCTATA: Primary | ICD-10-CM

## 2021-08-09 DIAGNOSIS — M79.672 LEFT FOOT PAIN: ICD-10-CM

## 2021-08-09 DIAGNOSIS — L98.9 SKIN LESION OF FOOT: ICD-10-CM

## 2021-08-09 PROCEDURE — 99242 OFF/OP CONSLTJ NEW/EST SF 20: CPT | Performed by: PODIATRIST

## 2021-08-09 PROCEDURE — 3008F BODY MASS INDEX DOCD: CPT | Performed by: FAMILY MEDICINE

## 2021-08-09 NOTE — PROGRESS NOTES
Assessment/Plan:     Trimmed hyperkeratotic lesion left foot  No evidence of verruca at this time  Rather, a punctate hyperkeratotic lesion or porokeratosis is noted  Reassured patient that this lesion is benign  Periodic palliative care advised if lesion becomes painful  No problem-specific Assessment & Plan notes found for this encounter  Diagnoses and all orders for this visit:    Keratoderma punctata    Skin lesion of foot  -     Ambulatory referral to Podiatry          Subjective:      Patient ID: Destin Toure is a 30 y o  female  HPI       Patient, a 71-year-old female presents with concern regarding a   hyperkeratotic lesion on the bottom of the left foot  Patient notes the presence of a small callus with a dot in the middle of it  It has been present for over 5-6 months  Minimal discomfort is noted today  Lesion had been painful a few weeks back  The following portions of the patient's history were reviewed and updated as appropriate: allergies, current medications, past family history, past medical history, past social history, past surgical history and problem list     Review of Systems   Respiratory: Positive for shortness of breath  Asthma   Gastrointestinal: Negative  Musculoskeletal: Negative  Neurological: Negative  Psychiatric/Behavioral: Negative  Objective:      /83   Pulse 87   Ht 4' 11" (1 499 m)   Wt 74 8 kg (165 lb)   BMI 33 33 kg/m²          Physical Exam  Constitutional:       Appearance: Normal appearance  Cardiovascular:      Pulses: Normal pulses  Musculoskeletal:         General: Normal range of motion  Skin:     Findings: Lesion present  Comments: Punctate hyperkeratotic lesion at 4th metatarsal head left foot  Neurological:      General: No focal deficit present  Mental Status: She is oriented to person, place, and time

## 2021-10-28 ENCOUNTER — IMMUNIZATIONS (OUTPATIENT)
Dept: FAMILY MEDICINE CLINIC | Facility: CLINIC | Age: 36
End: 2021-10-28
Payer: COMMERCIAL

## 2021-10-28 VITALS — TEMPERATURE: 97.6 F

## 2021-10-28 DIAGNOSIS — Z23 FLU VACCINE NEED: Primary | ICD-10-CM

## 2021-10-28 PROCEDURE — 90471 IMMUNIZATION ADMIN: CPT | Performed by: FAMILY MEDICINE

## 2021-10-28 PROCEDURE — 90686 IIV4 VACC NO PRSV 0.5 ML IM: CPT | Performed by: FAMILY MEDICINE

## 2022-06-24 ENCOUNTER — TELEPHONE (OUTPATIENT)
Dept: OBGYN CLINIC | Facility: CLINIC | Age: 37
End: 2022-06-24

## 2022-06-24 DIAGNOSIS — N94.6 DYSMENORRHEA: ICD-10-CM

## 2022-06-24 DIAGNOSIS — Z30.41 ENCOUNTER FOR SURVEILLANCE OF CONTRACEPTIVE PILLS: ICD-10-CM

## 2022-06-24 RX ORDER — NORETHINDRONE ACETATE AND ETHINYL ESTRADIOL 1.5-30(21)
1 KIT ORAL DAILY
Qty: 90 TABLET | Refills: 0 | Status: SHIPPED | OUTPATIENT
Start: 2022-06-24

## 2022-06-24 NOTE — TELEPHONE ENCOUNTER
Pt called to scheduled her yearly appointment which is scheduled for 9/12 with Quan Johnson  Pt also asked if she can get a birth control refill  Pt last seen on 5/25/21 by Melvin Ventura  Please advise! Thanks!

## 2022-09-07 NOTE — PROGRESS NOTES
Assessment   29 y o  Jesse Leslie presenting for annual exam      Plan   Diagnoses and all orders for this visit:    Encntr for gyn exam (general) (routine) w/o abn findings  -     Liquid-based pap, screening    Encounter for surveillance of contraceptive pills  -     norethindrone-ethinyl estradiol (Junel 1/20) 1-20 MG-MCG per tablet; Take 1 tablet by mouth daily    Dysmenorrhea        Pap collected today  Contraception- switch to [facility] 1/20  Rx sent to pharmacy  Reviewed risks of clot/CV risk with any estrogen containing birth control  SBE encouraged, A yearly mammogram is recommended for breast cancer screening starting at age 36  ASCCP guidelines reviewed  Gardisil vaccines recommended up to age 39  She will consider  Advised to call with any issues, all concerns & questions addressed  See provided information in your after visit summary     F/U Annually and PRN    Results will be released to [employer], if abnormal will call or message to review and discuss treatment plan      __________________________________________________________________    Leah Ortiz is a 29 y o  Jesse Leslie presenting for annual exam  She is without complaint  Doing well on Junel 1 5/30  She is interested is discussing a lower dose pill today  She is not using this OCP for acne or PCOS  She is not interested in an IUD or alternate LARC options  She is not a smoker  SCREENING  Last Pap: 12/2017; neg/neg    GYN  The patient's menstrual history is as follows:   Menarche Age: 16 years;  ; Period Cycle (Days): 28; Period Duration (Days): 4; Period Pattern: Regular; Menstrual Flow: Light; Dysmenorrhea: (!) Mild; Dysmenorrhea Symptoms: Cramping    Sexually active: Yes - single partner - male  Contraception: OCPs    Hx Abnormal pap: denies  We reviewed ASCCP guidelines for Pap testing today  Gardasil: She has not completed the Gardasil series      Denies vaginal discharge, itching, odor, dyspareunia, pelvic pain and vulvar/vaginal symptoms      OB         Complaints: denies   Denies urgency, frequency, hematuria, leakage / change in stream, difficulty urinating  BREAST  Complaints: denies   Denies: breast lump, breast tenderness, nipple discharge, skin color change, and skin lesion(s)  Personal hx: none      Pertinent Family Hx:   Family hx of breast cancer: PGM  Family hx of ovarian cancer: no  Family hx of colon cancer: no      GENERAL  PMH reviewed/updated and is as below  Patient does follow with a PCP      SOCIAL  Smoking: no  Alcohol: 6 pack per week; weekends only  Drug: no  Occupation:       Past Medical History:   Diagnosis Date    Asthma     Callus     Urinary tract infection     Verruca        Past Surgical History:   Procedure Laterality Date    NO PAST SURGERIES           Current Outpatient Medications:     norethindrone-ethinyl estradiol-iron (Junel FE 1 ) 1 5-30 MG-MCG tablet, Take 1 tablet by mouth daily, Disp: 90 tablet, Rfl: 0    albuterol (ACCUNEB) 1 25 MG/3ML nebulizer solution, Inhale, Disp: , Rfl:     albuterol (Ventolin HFA) 90 mcg/act inhaler, Inhale 2 puffs every 6 (six) hours as needed for wheezing or shortness of breath, Disp: 18 g, Rfl: 0    fexofenadine (ALLEGRA) 60 MG tablet, Take 60 mg by mouth daily, Disp: , Rfl:     PROAIR  (90 Base) MCG/ACT inhaler, USE 2 PUFFS EVERY 4 HOURS AS NEEDED FOR WHEEZING, Disp: , Rfl: 5    SYMBICORT 80-4 5 MCG/ACT inhaler, Inhale 2 puffs 2 (two) times a day, Disp: 1 Inhaler, Rfl: 5    No Known Allergies    Social History     Socioeconomic History    Marital status: /Civil Union     Spouse name: Not on file    Number of children: Not on file    Years of education: Not on file    Highest education level: Not on file   Occupational History    Not on file   Tobacco Use    Smoking status: Never Smoker    Smokeless tobacco: Never Used   Vaping Use    Vaping Use: Never used   Substance and Sexual Activity    Alcohol use: Yes     Alcohol/week: 3 0 - 5 0 standard drinks     Types: 3 - 5 Cans of beer per week    Drug use: No    Sexual activity: Yes     Partners: Male     Birth control/protection: Condom Male, OCP   Other Topics Concern    Not on file   Social History Narrative    Not on file     Social Determinants of Health     Financial Resource Strain: Not on file   Food Insecurity: Not on file   Transportation Needs: Not on file   Physical Activity: Not on file   Stress: Not on file   Social Connections: Not on file   Intimate Partner Violence: Not on file   Housing Stability: Not on file       Review of Systems     ROS:  Constitutional: Negative for fatigue and unexpected weight change  Respiratory: Negative for cough and shortness of breath  Cardiovascular: Negative for chest pain and palpitations  Gastrointestinal: Negative for abdominal pain and change in bowel habits  Breasts:  Negative, other than as noted above  Genitourinary: Negative, other than as noted above  Psychiatric: Negative for mood difficulties  Objective         Vitals:    09/13/22 0752   BP: 120/72       Physical Examination:    Patient appears well and is not in distress  Neck is supple without masses, no cervical or supraclavicular lymphadenopathy  Cardiovascular: regular rate and rhythm; no murmurs  Lungs: clear to auscultation bilaterally; no wheezes  Breasts are symmetrical without mass, tenderness, nipple discharge, skin changes or adenopathy  Abdomen is soft and nontender without masses  External genitals are normal without lesions or rashes  Urethral meatus and urethra are normal  Bladder is normal to palpation  Vagina is normal without discharge or bleeding  Cervix is normal without discharge or lesion  Uterus is normal, mobile, nontender without palpable mass  Adnexa are normal, nontender, without palpable mass

## 2022-09-13 ENCOUNTER — ANNUAL EXAM (OUTPATIENT)
Dept: OBGYN CLINIC | Facility: CLINIC | Age: 37
End: 2022-09-13
Payer: COMMERCIAL

## 2022-09-13 VITALS
DIASTOLIC BLOOD PRESSURE: 72 MMHG | WEIGHT: 164 LBS | BODY MASS INDEX: 33.06 KG/M2 | HEIGHT: 59 IN | SYSTOLIC BLOOD PRESSURE: 120 MMHG

## 2022-09-13 DIAGNOSIS — Z30.41 ENCOUNTER FOR SURVEILLANCE OF CONTRACEPTIVE PILLS: ICD-10-CM

## 2022-09-13 DIAGNOSIS — Z01.419 ENCNTR FOR GYN EXAM (GENERAL) (ROUTINE) W/O ABN FINDINGS: ICD-10-CM

## 2022-09-13 DIAGNOSIS — N94.6 DYSMENORRHEA: ICD-10-CM

## 2022-09-13 PROCEDURE — 0503F POSTPARTUM CARE VISIT: CPT | Performed by: PHYSICIAN ASSISTANT

## 2022-09-13 PROCEDURE — G0145 SCR C/V CYTO,THINLAYER,RESCR: HCPCS | Performed by: PHYSICIAN ASSISTANT

## 2022-09-13 PROCEDURE — G0476 HPV COMBO ASSAY CA SCREEN: HCPCS | Performed by: PHYSICIAN ASSISTANT

## 2022-09-13 PROCEDURE — 99395 PREV VISIT EST AGE 18-39: CPT | Performed by: PHYSICIAN ASSISTANT

## 2022-09-13 RX ORDER — NORETHINDRONE ACETATE AND ETHINYL ESTRADIOL 1; .02 MG/1; MG/1
1 TABLET ORAL DAILY
Qty: 84 TABLET | Refills: 3 | Status: SHIPPED | OUTPATIENT
Start: 2022-09-13

## 2022-09-15 LAB
HPV HR 12 DNA CVX QL NAA+PROBE: NEGATIVE
HPV16 DNA CVX QL NAA+PROBE: NEGATIVE
HPV18 DNA CVX QL NAA+PROBE: NEGATIVE

## 2022-09-21 LAB
LAB AP GYN PRIMARY INTERPRETATION: NORMAL
Lab: NORMAL

## 2022-12-13 ENCOUNTER — OFFICE VISIT (OUTPATIENT)
Dept: FAMILY MEDICINE CLINIC | Facility: CLINIC | Age: 37
End: 2022-12-13

## 2022-12-13 VITALS
WEIGHT: 163 LBS | HEIGHT: 59 IN | TEMPERATURE: 99.7 F | SYSTOLIC BLOOD PRESSURE: 122 MMHG | OXYGEN SATURATION: 99 % | BODY MASS INDEX: 32.86 KG/M2 | DIASTOLIC BLOOD PRESSURE: 78 MMHG | HEART RATE: 88 BPM

## 2022-12-13 DIAGNOSIS — J45.901 MILD ASTHMA WITH ACUTE EXACERBATION, UNSPECIFIED WHETHER PERSISTENT: ICD-10-CM

## 2022-12-13 DIAGNOSIS — J06.9 VIRAL URI WITH COUGH: Primary | ICD-10-CM

## 2022-12-13 RX ORDER — BENZONATATE 200 MG/1
200 CAPSULE ORAL 3 TIMES DAILY PRN
Qty: 20 CAPSULE | Refills: 0 | Status: SHIPPED | OUTPATIENT
Start: 2022-12-13

## 2022-12-13 RX ORDER — PREDNISONE 10 MG/1
TABLET ORAL
Qty: 30 TABLET | Refills: 1 | Status: SHIPPED | OUTPATIENT
Start: 2022-12-13

## 2022-12-13 RX ORDER — DILTIAZEM HYDROCHLORIDE 60 MG/1
2 TABLET, FILM COATED ORAL 2 TIMES DAILY
Qty: 10.2 G | Refills: 1 | Status: SHIPPED | OUTPATIENT
Start: 2022-12-13

## 2022-12-13 NOTE — PROGRESS NOTES
Name: Jaquelin Honeycutt      : 1985      MRN: [id number]  Encounter Provider: Adelita Cardona DO  Encounter Date: 2022   Encounter department: [address] [place] [address]  Viral URI with cough  -     predniSONE 10 mg tablet; Take 5 tabs x 2 days, 4 tabs x 2 days, 3 tabs x 2 days, 2 tabs x 2days, 1 tab x 2days with food  -     benzonatate (TESSALON) 200 MG capsule; Take 1 capsule (200 mg total) by mouth 3 (three) times a day as needed for cough    2  Mild asthma with acute exacerbation, unspecified whether persistent  -     Symbicort 80-4 5 MCG/ACT inhaler; Inhale 2 puffs 2 (two) times a day  -     predniSONE 10 mg tablet; Take 5 tabs x 2 days, 4 tabs x 2 days, 3 tabs x 2 days, 2 tabs x 2days, 1 tab x 2days with food  -     benzonatate (TESSALON) 200 MG capsule; Take 1 capsule (200 mg total) by mouth 3 (three) times a day as needed for cough         Subjective      Chief Complaint   Patient presents with   • Cold Like Symptoms     3 covid test neg  Sore throat x 1 week  Throat is feeling better, but she started with a whezzy cough last night and she does have asthma  Review of Systems   HENT: Positive for sore throat  Respiratory: Positive for cough and wheezing          Current Outpatient Medications on File Prior to Visit   Medication Sig   • albuterol (ACCUNEB) 1 25 MG/3ML nebulizer solution Inhale   • albuterol (Ventolin HFA) 90 mcg/act inhaler Inhale 2 puffs every 6 (six) hours as needed for wheezing or shortness of breath   • fexofenadine (ALLEGRA) 60 MG tablet Take 60 mg by mouth daily   • norethindrone-ethinyl estradiol (Junel 1/20) 1-20 MG-MCG per tablet Take 1 tablet by mouth daily   • PROAIR  (90 Base) MCG/ACT inhaler USE 2 PUFFS EVERY 4 HOURS AS NEEDED FOR WHEEZING       Objective     /78   Pulse 88   Temp 99 7 °F (37 6 °C) (Tympanic)   Ht 4' 11 25" (1 505 m)   Wt 73 9 kg (163 lb)   SpO2 99%   BMI 32 64 kg/m²     Physical Exam  Vitals and nursing note reviewed  Constitutional:       General: She is not in acute distress  HENT:      Head: Normocephalic  Ears:      Comments: TM's dull     Mouth/Throat:      Pharynx: Posterior oropharyngeal erythema present  Neck:      Thyroid: No thyromegaly  Cardiovascular:      Rate and Rhythm: Normal rate and regular rhythm  Heart sounds: Normal heart sounds  Pulmonary:      Effort: Pulmonary effort is normal       Breath sounds: Wheezing present  Lymphadenopathy:      Cervical: No cervical adenopathy  Skin:     General: Skin is warm and dry  Neurological:      Mental Status: She is alert and oriented to person, place, and time     Psychiatric:         Mood and Affect: Mood normal        Paul Graham DO

## 2023-06-26 ENCOUNTER — TELEPHONE (OUTPATIENT)
Dept: FAMILY MEDICINE CLINIC | Facility: CLINIC | Age: 38
End: 2023-06-26

## 2023-07-06 ENCOUNTER — TELEPHONE (OUTPATIENT)
Dept: FAMILY MEDICINE CLINIC | Facility: CLINIC | Age: 38
End: 2023-07-06

## 2023-09-14 NOTE — PROGRESS NOTES
Patient presents for a routine annual visit  Last Pap Smear- 2022 neg/neg  HPV vaccine- not completed   LMP- 23 - regular - 28 days - light - mild cramps   Birth control- pill     nonsmoker  Currently sexually active - one partner   Declines STD testing   No family history of uterine, ovarian, cervical cancer  PGM breast cancer  No concerns/questions for today's visit

## 2023-09-19 ENCOUNTER — ANNUAL EXAM (OUTPATIENT)
Dept: OBGYN CLINIC | Facility: CLINIC | Age: 38
End: 2023-09-19
Payer: COMMERCIAL

## 2023-09-19 VITALS
HEIGHT: 58 IN | WEIGHT: 156 LBS | BODY MASS INDEX: 32.75 KG/M2 | SYSTOLIC BLOOD PRESSURE: 108 MMHG | DIASTOLIC BLOOD PRESSURE: 74 MMHG

## 2023-09-19 DIAGNOSIS — Z30.41 ENCOUNTER FOR SURVEILLANCE OF CONTRACEPTIVE PILLS: ICD-10-CM

## 2023-09-19 DIAGNOSIS — Z01.419 WELL WOMAN EXAM WITH ROUTINE GYNECOLOGICAL EXAM: Primary | ICD-10-CM

## 2023-09-19 PROCEDURE — 99395 PREV VISIT EST AGE 18-39: CPT | Performed by: PHYSICIAN ASSISTANT

## 2023-09-19 RX ORDER — NORETHINDRONE ACETATE AND ETHINYL ESTRADIOL 1; .02 MG/1; MG/1
1 TABLET ORAL DAILY
Qty: 84 TABLET | Refills: 3 | Status: SHIPPED | OUTPATIENT
Start: 2023-09-19

## 2023-09-19 NOTE — PROGRESS NOTES
Assessment   40 y.o. Dionisio Martell presenting for annual exam.     Plan   Diagnoses and all orders for this visit:    Well woman exam with routine gynecological exam    Encounter for surveillance of contraceptive pills  -     norethindrone-ethinyl estradiol (Junel 1/20) 1-20 MG-MCG per tablet; Take 1 tablet by mouth daily        Pap up to date  Contraception- happy with current OCP; normotensive and without side effects; refills sent to pharmacy    Perineal hygiene reviewed. Weight bearing exercises minium of 150 mins/weekly advised. Kegel exercises recommended. SBE encouraged, A yearly mammogram is recommended for breast cancer screening starting at age 36. ASCCP guidelines reviewed. Condoms encouraged with all sexual activity to prevent STI's. Gardisil vaccines recommended up to age 39. Calcium/ Vit D dietary requirements discussed. Advised to call with any issues, all concerns & questions addressed. See provided information in your after visit summary     F/U Annually and PRN    Results will be released to [employer] if abnormal will call or message to review and discuss treatment plan.     __________________________________________________________________    Aysha Aguilar is a 40 y.o. Dionisio Martell presenting for annual exam. She is without complaint and does not want STD testing today. SCREENING  Last Pap: 2022 neg/neg      GYN  The patient's menstrual history is as follows:    ;  ; Period Cycle (Days): 28; Period Duration (Days): 28; Period Pattern: Regular; Menstrual Flow: Light; Dysmenorrhea: (!) Mild; Dysmenorrhea Symptoms: Cramping    Sexually active: Yes - single partner - male  Contraception: OCPs    Hx Abnormal pap: denies  We reviewed ASCCP guidelines for Pap testing today.     Denies vaginal discharge, itching, odor, dyspareunia, pelvic pain and vulvar/vaginal symptoms      OB           Complaints: denies   Denies urgency, frequency, hematuria, leakage / change in stream, difficulty urinating. BREAST  Complaints: denies   Denies: breast lump, breast tenderness, nipple discharge, skin color change, and skin lesion(s)      Pertinent Family Hx:   Family hx of breast cancer: PGM  Family hx of ovarian cancer: no  Family hx of colon cancer: no      GENERAL  PMH reviewed/updated and is as below. Patient does follow with a PCP. SOCIAL  Smoking: no  Alcohol:8 drinks per week  Drug: no  Occupation:       Past Medical History:   Diagnosis Date   • Asthma    • Callus    • Urinary tract infection    • Verruca        Past Surgical History:   Procedure Laterality Date   • NO PAST SURGERIES           Current Outpatient Medications:   •  albuterol (ACCUNEB) 1.25 MG/3ML nebulizer solution, Inhale, Disp: , Rfl:   •  albuterol (Ventolin HFA) 90 mcg/act inhaler, Inhale 2 puffs every 6 (six) hours as needed for wheezing or shortness of breath, Disp: 18 g, Rfl: 0  •  benzonatate (TESSALON) 200 MG capsule, Take 1 capsule (200 mg total) by mouth 3 (three) times a day as needed for cough, Disp: 20 capsule, Rfl: 0  •  fexofenadine (ALLEGRA) 60 MG tablet, Take 60 mg by mouth daily, Disp: , Rfl:   •  norethindrone-ethinyl estradiol (Junel 1/20) 1-20 MG-MCG per tablet, Take 1 tablet by mouth daily, Disp: 84 tablet, Rfl: 3  •  predniSONE 10 mg tablet, Take 5 tabs x 2 days, 4 tabs x 2 days, 3 tabs x 2 days, 2 tabs x 2days, 1 tab x 2days with food. , Disp: 30 tablet, Rfl: 1  •  PROAIR  (90 Base) MCG/ACT inhaler, USE 2 PUFFS EVERY 4 HOURS AS NEEDED FOR WHEEZING, Disp: , Rfl: 5  •  Symbicort 80-4.5 MCG/ACT inhaler, Inhale 2 puffs 2 (two) times a day, Disp: 10.2 g, Rfl: 1    No Known Allergies    Social History     Socioeconomic History   • Marital status: /Civil Union     Spouse name: Not on file   • Number of children: Not on file   • Years of education: Not on file   • Highest education level: Not on file   Occupational History   • Not on file   Tobacco Use   • Smoking status: Never   • Smokeless tobacco: Never   Vaping Use   • Vaping Use: Never used   Substance and Sexual Activity   • Alcohol use: Yes     Alcohol/week: 3.0 - 5.0 standard drinks of alcohol     Types: 3 - 5 Cans of beer per week   • Drug use: No   • Sexual activity: Yes     Partners: Male     Birth control/protection: Condom Male, OCP   Other Topics Concern   • Not on file   Social History Narrative   • Not on file     Social Determinants of Health     Financial Resource Strain: Not on file   Food Insecurity: Not on file   Transportation Needs: Not on file   Physical Activity: Not on file   Stress: Not on file   Social Connections: Not on file   Intimate Partner Violence: Not on file   Housing Stability: Not on file       Review of Systems     ROS:  Constitutional: Negative for fatigue and unexpected weight change. Respiratory: Negative for cough and shortness of breath. Cardiovascular: Negative for chest pain and palpitations. Gastrointestinal: Negative for abdominal pain and change in bowel habits  Breasts:  Negative, other than as noted above. Genitourinary: Negative, other than as noted above. Psychiatric: Negative for mood difficulties. Objective         Vitals:    09/19/23 0712   BP: 108/74       Physical Examination:    Patient appears well and is not in distress  Neck is supple without masses, no cervical or supraclavicular lymphadenopathy  Cardiovascular: regular rate and rhythm; no murmurs  Lungs: clear to auscultation bilaterally; no wheezes  Breasts are symmetrical without mass, tenderness, nipple discharge, skin changes or adenopathy. Abdomen is soft and nontender without masses. External genitals are normal without lesions or rashes. Urethral meatus and urethra are normal  Bladder is normal to palpation  Vagina is normal without discharge or bleeding. Cervix is normal without discharge or lesion. Uterus is normal, mobile, nontender without palpable mass.   Adnexa are normal, nontender, without palpable mass.

## 2023-12-06 ENCOUNTER — OFFICE VISIT (OUTPATIENT)
Dept: FAMILY MEDICINE CLINIC | Facility: CLINIC | Age: 38
End: 2023-12-06
Payer: COMMERCIAL

## 2023-12-06 VITALS
TEMPERATURE: 97.8 F | HEART RATE: 107 BPM | WEIGHT: 158 LBS | BODY MASS INDEX: 33.17 KG/M2 | OXYGEN SATURATION: 99 % | DIASTOLIC BLOOD PRESSURE: 78 MMHG | HEIGHT: 58 IN | SYSTOLIC BLOOD PRESSURE: 110 MMHG

## 2023-12-06 DIAGNOSIS — K12.2 INFECTION OF MOUTH: Primary | ICD-10-CM

## 2023-12-06 PROCEDURE — 99213 OFFICE O/P EST LOW 20 MIN: CPT | Performed by: FAMILY MEDICINE

## 2023-12-06 RX ORDER — AMOXICILLIN AND CLAVULANATE POTASSIUM 875; 125 MG/1; MG/1
1 TABLET, FILM COATED ORAL EVERY 12 HOURS SCHEDULED
Qty: 14 TABLET | Refills: 0 | Status: SHIPPED | OUTPATIENT
Start: 2023-12-06 | End: 2023-12-13

## 2023-12-06 NOTE — ASSESSMENT & PLAN NOTE
Appears consistent with early infection given localized swelling and erythema; advised abx as ordered and recommended f/u with dentist; f/u guidance given

## 2023-12-06 NOTE — PROGRESS NOTES
Assessment/Plan:     1. Infection of mouth  Assessment & Plan:  Appears consistent with early infection given localized swelling and erythema; advised abx as ordered and recommended f/u with dentist; f/u guidance given    Orders:  -     amoxicillin-clavulanate (AUGMENTIN) 875-125 mg per tablet; Take 1 tablet by mouth every 12 (twelve) hours for 7 days          Subjective:      Patient ID: Maximiliano Riggs is a 40 y.o. female. Patient is here with concerns of pain in back of mouth. Patient states she bit the back of her mouth on Saturday. Has some bloody discharge this am after brushing teeth. Seems to be difficult with eating. Using some salt water. Feels like it seems to be getting red on top of mouth. No fevers. The following portions of the patient's history were reviewed and updated as appropriate: allergies, current medications, past family history, past medical history, past social history, past surgical history, and problem list.    Review of Systems   Constitutional:  Negative for chills and fever. HENT:  Positive for dental problem. Objective:      /78 (BP Location: Left arm, Patient Position: Sitting, Cuff Size: Standard)   Pulse (!) 107   Temp 97.8 °F (36.6 °C) (Temporal)   Ht 4' 10" (1.473 m)   Wt 71.7 kg (158 lb)   SpO2 99%   BMI 33.02 kg/m²          Physical Exam  Vitals reviewed. Constitutional:       General: She is not in acute distress. Appearance: Normal appearance. She is not ill-appearing, toxic-appearing or diaphoretic. HENT:      Head: Normocephalic and atraumatic. Mouth/Throat:     Eyes:      General: No scleral icterus. Right eye: No discharge. Left eye: No discharge. Conjunctiva/sclera: Conjunctivae normal.   Cardiovascular:      Rate and Rhythm: Normal rate and regular rhythm. Pulses: Normal pulses. Heart sounds: Normal heart sounds. No murmur heard. No gallop.    Pulmonary:      Effort: Pulmonary effort is normal. No respiratory distress. Breath sounds: Normal breath sounds. No stridor. No wheezing, rhonchi or rales. Musculoskeletal:      Right lower leg: No edema. Left lower leg: No edema. Lymphadenopathy:      Cervical: Cervical adenopathy present. Neurological:      General: No focal deficit present. Mental Status: She is alert and oriented to person, place, and time. Psychiatric:         Mood and Affect: Mood normal.         Behavior: Behavior normal.         Thought Content:  Thought content normal.         Judgment: Judgment normal.

## 2024-03-19 ENCOUNTER — OFFICE VISIT (OUTPATIENT)
Dept: FAMILY MEDICINE CLINIC | Facility: CLINIC | Age: 39
End: 2024-03-19
Payer: COMMERCIAL

## 2024-03-19 VITALS
WEIGHT: 159 LBS | OXYGEN SATURATION: 100 % | SYSTOLIC BLOOD PRESSURE: 120 MMHG | TEMPERATURE: 99.3 F | HEART RATE: 109 BPM | DIASTOLIC BLOOD PRESSURE: 84 MMHG | BODY MASS INDEX: 33.23 KG/M2

## 2024-03-19 DIAGNOSIS — H61.22 LEFT EAR IMPACTED CERUMEN: Primary | ICD-10-CM

## 2024-03-19 PROCEDURE — 69210 REMOVE IMPACTED EAR WAX UNI: CPT

## 2024-03-19 PROCEDURE — 99213 OFFICE O/P EST LOW 20 MIN: CPT

## 2024-03-19 NOTE — PROGRESS NOTES
Name: Aaliyah Mcdowell      : 1985      MRN: [id number]  Encounter Provider: Jane Alvares PA-C  Encounter Date: 3/19/2024   Encounter department: [facility]    Assessment & Plan     1. Left ear impacted cerumen  Assessment & Plan:  Patient presents today with concerns of left ear fullness that she has had for many years, however it became worse on Friday and her ear has not unblocked how it usually does.  Exam today reveals impacted cerumen in the left ear.  Patient agreeable with ear flush today, discussed risks and benefits with patient and patient would like to proceed with procedure.  Moderate amount of cerumen removed from the left ear, patient reported immediate improvement in hearing and ear fullness.  Discussed with patient that it is normal to have some irritation in the left ear after a cerumen removal, recommended mineral oil eardrops if the feeling continues for a few days.  Recommended use of Debrox drops at least once a month to prevent future wax buildup.  Patient agreeable with plan today, and will follow-up with us as needed.    Orders:  -     Ear cerumen removal           Subjective      Chief Complaint   Patient presents with    Ear Fullness     Left ear  Started a few years ago  Start getting worse Friday  Normally blocked in mornings and equalizes within an hour  Has not unblocked since Friday  Has not tried any home cleaning kits  Pressure and some random sharp quick pain       Patient presents today to discuss her left ear fullness that has been present for many years.  She states her left ear fullness started to get bad on Friday and she has been having some sharp pains in the left ear.  The left ear has remained full since Friday, she has not tried anything over-the-counter to help with the ear fullness.  She has never had her ears cleaned out before.  She states usually her ear feels blocked in the morning and then it gets better after few hours.   However, the feeling is now getting worse.  No drainage from the ear.  Endorses hearing loss in the left ear.  Right ear is without pain or blockage.    Ear Fullness   There is pain in the left ear. This is a chronic problem. The current episode started more than 1 year ago. The problem occurs constantly. The problem has been rapidly worsening. There has been no fever. The pain is mild. Associated symptoms include hearing loss. Pertinent negatives include no coughing, ear discharge, headaches, rhinorrhea or sore throat. She has tried nothing for the symptoms. There is no history of a chronic ear infection, hearing loss or a tympanostomy tube.     Review of Systems   Constitutional:  Negative for chills, fatigue and fever.   HENT:  Positive for ear pain and hearing loss. Negative for congestion, ear discharge, rhinorrhea, sinus pressure, sinus pain, sore throat and tinnitus.    Respiratory:  Negative for cough, chest tightness and shortness of breath.    Cardiovascular:  Negative for chest pain, palpitations and leg swelling.   Neurological:  Negative for dizziness, light-headedness and headaches.       Current Outpatient Medications on File Prior to Visit   Medication Sig    albuterol (Ventolin HFA) 90 mcg/act inhaler Inhale 2 puffs every 6 (six) hours as needed for wheezing or shortness of breath    fexofenadine (ALLEGRA) 60 MG tablet Take 60 mg by mouth daily    norethindrone-ethinyl estradiol (Junel 1/20) 1-20 MG-MCG per tablet Take 1 tablet by mouth daily    Symbicort 80-4.5 MCG/ACT inhaler Inhale 2 puffs 2 (two) times a day    albuterol (ACCUNEB) 1.25 MG/3ML nebulizer solution Inhale (Patient not taking: Reported on 9/19/2023)    PROAIR  (90 Base) MCG/ACT inhaler USE 2 PUFFS EVERY 4 HOURS AS NEEDED FOR WHEEZING (Patient not taking: Reported on 9/19/2023)       Objective     /84 (BP Location: Left arm, Patient Position: Sitting, Cuff Size: Standard)   Pulse (!) 109   Temp 99.3 °F (37.4 °C)  (Temporal)   Wt 72.1 kg (159 lb)   SpO2 100%   BMI 33.23 kg/m²     Physical Exam  Vitals and nursing note reviewed.   Constitutional:       General: She is not in acute distress.     Appearance: Normal appearance. She is not ill-appearing or diaphoretic.   HENT:      Head: Normocephalic and atraumatic.      Right Ear: Tympanic membrane normal.      Left Ear: Tympanic membrane and external ear normal. Decreased hearing noted. No drainage.  No middle ear effusion. There is impacted cerumen.   Pulmonary:      Effort: Pulmonary effort is normal. No respiratory distress.   Skin:     Coloration: Skin is not cyanotic or pale.   Neurological:      General: No focal deficit present.      Mental Status: She is alert and oriented to person, place, and time.   Psychiatric:         Mood and Affect: Mood normal.         Behavior: Behavior normal. Behavior is cooperative.         Judgment: Judgment normal.     Ear cerumen removal    Date/Time: 3/19/2024 8:40 AM    Performed by: Jane Alvares PA-C  Authorized by: Jane Alvares PA-C  Universal Protocol:  Consent: Verbal consent obtained.  Risks and benefits: risks, benefits and alternatives were discussed  Consent given by: patient  Patient understanding: patient states understanding of the procedure being performed  Patient consent: the patient's understanding of the procedure matches consent given  Procedure consent: procedure consent matches procedure scheduled  Patient identity confirmed: verbally with patient    Patient location:  Clinic  Procedure details:     Local anesthetic:  None    Location:  L ear    Procedure type: irrigation with instrumentation      Instrumentation: loop    Post-procedure details:     Complication:  None    Hearing quality:  Improved    Patient tolerance of procedure:  Tolerated well, no immediate complications  Comments:      Moderate amount of cerumen removed from the left ear, without complication. Patient reported immediate  improvement in hearing and ear fullness feeling.       Jane Alvares PA-C

## 2024-03-19 NOTE — ASSESSMENT & PLAN NOTE
Patient presents today with concerns of left ear fullness that she has had for many years, however it became worse on Friday and her ear has not unblocked how it usually does.  Exam today reveals impacted cerumen in the left ear.  Patient agreeable with ear flush today, discussed risks and benefits with patient and patient would like to proceed with procedure.  Moderate amount of cerumen removed from the left ear, patient reported immediate improvement in hearing and ear fullness.  Discussed with patient that it is normal to have some irritation in the left ear after a cerumen removal, recommended mineral oil eardrops if the feeling continues for a few days.  Recommended use of Debrox drops at least once a month to prevent future wax buildup.  Patient agreeable with plan today, and will follow-up with us as needed.   regular